# Patient Record
Sex: MALE | Race: BLACK OR AFRICAN AMERICAN | URBAN - METROPOLITAN AREA
[De-identification: names, ages, dates, MRNs, and addresses within clinical notes are randomized per-mention and may not be internally consistent; named-entity substitution may affect disease eponyms.]

---

## 2017-07-19 ENCOUNTER — OFFICE VISIT (OUTPATIENT)
Dept: INTERNAL MEDICINE CLINIC | Age: 57
End: 2017-07-19

## 2017-07-19 VITALS
DIASTOLIC BLOOD PRESSURE: 83 MMHG | RESPIRATION RATE: 18 BRPM | OXYGEN SATURATION: 100 % | HEIGHT: 74 IN | SYSTOLIC BLOOD PRESSURE: 128 MMHG | HEART RATE: 68 BPM | TEMPERATURE: 98 F | WEIGHT: 253.2 LBS | BODY MASS INDEX: 32.49 KG/M2

## 2017-07-19 DIAGNOSIS — Z12.5 SCREENING FOR PROSTATE CANCER: ICD-10-CM

## 2017-07-19 DIAGNOSIS — Z00.00 ROUTINE MEDICAL EXAM: Primary | ICD-10-CM

## 2017-07-19 DIAGNOSIS — B35.4 TINEA CORPORIS: ICD-10-CM

## 2017-07-19 DIAGNOSIS — Z12.11 SCREENING FOR COLON CANCER: ICD-10-CM

## 2017-07-19 NOTE — PATIENT INSTRUCTIONS
FOR THE FACIAL PATCHES, USE LOTRIMIN CREAM TWICE A DAY FOR 2-4 WEEKS. IF IN THE SUN, WEAR SUNSCREEN SINCE THE SUN WILL WORSEN THE RASH.

## 2017-07-19 NOTE — PROGRESS NOTES
HPI: Alberto Adams is a 49 y.o. male presents to establish. He does not recall last physical.  [facility]. Saw a chiropractor, better now. No medications for pain. Works for [employer], as a technician for 15 years. Is active. No sx with exertion. Family history of diabetes and HTN. Nonsmoker. , kids (18-35). Coping well with stress. Normal bowel and bladder habits. Stable weight. Reports right knee dark discoloration. Is kneeling a lot. Wearing pads on knees. ROS:  Constitutional: negative for fevers, chills, anorexia, weight loss  Eyes:   negative for visual disturbance, irritation  ENT:   negative for tinnitus,sore throat,nasal congestion,ear pain,  Respiratory:  negative for cough, hemoptysis, dyspnea,wheezing  CV:   negative for chest pain, palpitations, lower extremity edema  GI:   negative for nausea, vomiting, diarrhea, abdominal pain,melena  Endo:               negative for polyuria,polydipsia,polyphagia,heat intolerance  Genitourinary: negative for frequency, dysuria, hematuria  Integument:  negative for pruritus,positive for facial rash.    Hematologic:  negative for easy bruising and gum/nose bleeding  Musculoskel: negative for myalgias, back pain, muscle weakness, joint pain  Neurological:  negative for headaches, dizziness, gait problems, numbness  Behavl/Psych: negative for feelings of anxiety, depression, mood changes    Past Medical History:   Diagnosis Date    Arthritis     Left arm     Past Surgical History:   Procedure Laterality Date    HX HEENT      tooth implant right side     Social History     Social History    Marital status:      Spouse name: N/A    Number of children: N/A    Years of education: N/A     Social History Main Topics    Smoking status: Never Smoker    Smokeless tobacco: Never Used    Alcohol use No    Drug use: No    Sexual activity: Yes     Partners: Female     Other Topics Concern    None Social History Narrative    None     Family History   Problem Relation Age of Onset    Heart Disease Mother      cardiomyopathy due to chemo    Lung Disease Mother     Cancer Mother     Diabetes Father     Other Father      was shot    Hypertension Brother        Allergies   Allergen Reactions    Seasonale [Levonorgestrel-Ethinyl Estrad] Sneezing         Physical exam:  Visit Vitals    /83 (BP 1 Location: Left arm, BP Patient Position: Sitting)    Pulse 68    Temp 98 °F (36.7 °C) (Oral)    Resp 18    Ht 6' 2\" (1.88 m)    Wt 253 lb 3.2 oz (114.9 kg)    SpO2 100%    BMI 32.51 kg/m2     General appearance - alert, well appearing, and in no distress  HEENT- PERLL,normal conjunctiva, TM normal bilaterally, mucous membranes moist, pharynx normal without lesions  Neck - supple, no significant adenopathy   Pulm- clear to auscultation, no wheezes, rales or rhonchi  CV- normal rate, regular rhythm, normal S1, S2, no murmurs   Abdomen - soft, nontender, nondistended, no masses or organomegaly  Extrem-peripheral pulses normal, no pedal edema  Neuro -alert, oriented,nonfocal  Skin: facial hypopigmented patches. Assessment/Plan:    1. Routine medical exam- Recommend heart healthy diet and regular cardiovascular exercise. - METABOLIC PANEL, COMPREHENSIVE; Future  - CBC W/O DIFF; Future  - PSA W/ REFLX FREE PSA; Future  - LIPID PANEL; Future  - URINALYSIS W/ RFLX MICROSCOPIC; Future    2. Screening for prostate cancer    - PSA W/ REFLX FREE PSA; Future    3. Screening for colon cancer    - REFERRAL TO GASTROENTEROLOGY    4. Tinea corporis- use lotrimin cream, avoid sun exposure. Follow-up Disposition:  Return for follow up for fasting labs.   Eryn Curran MD

## 2017-07-19 NOTE — MR AVS SNAPSHOT
Visit Information Date & Time Provider Department Dept. Phone Encounter #  
 7/19/2017  9:30 AM Birgit Smith, [address] [id number] Follow-up Instructions Return for follow up for fasting labs. Melonie Jackson Upcoming Health Maintenance Date Due Hepatitis C Screening 1960 DTaP/Tdap/Td series (1 - Tdap) 12/12/1983 FOBT Q 1 YEAR AGE 50-75 12/12/2012 INFLUENZA AGE 9 TO ADULT 8/1/2017 Allergies as of 7/19/2017  Review Complete On: 7/19/2017 By: Birgit Smith MD  
  
 Severity Noted Reaction Type Reactions Seasonale [Levonorgestrel-ethinyl Estrad]  07/19/2017    Sneezing Current Immunizations  Never Reviewed No immunizations on file. Not reviewed this visit You Were Diagnosed With   
  
 Codes Comments Routine medical exam    -  Primary ICD-10-CM: Z00.00 ICD-9-CM: V70.0 Screening for prostate cancer     ICD-10-CM: Z12.5 ICD-9-CM: V76.44 Screening for colon cancer     ICD-10-CM: Z12.11 ICD-9-CM: V76.51 Tinea corporis     ICD-10-CM: B35.4 ICD-9-CM: 110.5 Vitals BP Pulse Temp Resp Height(growth percentile) Weight(growth percentile) 128/83 (BP 1 Location: Left arm, BP Patient Position: Sitting) 68 98 °F (36.7 °C) (Oral) 18 6' 2\" (1.88 m) 253 lb 3.2 oz (114.9 kg) SpO2 BMI Smoking Status 100% 32.51 kg/m2 Never Smoker BMI and BSA Data Body Mass Index Body Surface Area 32.51 kg/m 2 2.45 m 2 Preferred Pharmacy Pharmacy Name Phone [facility] [address]  [place] [phone] Your Updated Medication List  
  
Notice  As of 7/19/2017 10:32 AM  
 You have not been prescribed any medications. We Performed the Following REFERRAL TO GASTROENTEROLOGY [GOI36 Custom] Comments:  
 Colon screening Follow-up Instructions Return for follow up for fasting labs. Jayshreenéstor Paz To-Do List   
 07/19/2017 Lab:  CBC W/O DIFF   
  
 07/19/2017 Lab:  LIPID PANEL   
  
 07/19/2017 Lab:  METABOLIC PANEL, COMPREHENSIVE   
  
 07/19/2017 Lab:  PSA W/ REFLX FREE PSA   
  
 07/19/2017 Lab:  URINALYSIS W/ RFLX MICROSCOPIC Referral Information Referral ID Referred By Referred To  
  
 [id number] [facility] [address] [place], [address] [facility] Visits Status Start Date End Date 1 New Request 7/19/17 7/19/18 If your referral has a status of pending review or denied, additional information will be sent to support the outcome of this decision. Patient Instructions FOR THE FACIAL PATCHES, USE LOTRIMIN CREAM TWICE A DAY FOR 2-4 WEEKS. IF IN THE SUN, WEAR SUNSCREEN SINCE THE SUN WILL WORSEN THE RASH. Introducing \[facility]\"" & HEALTH SERVICES! Jenise Culver introduces [employer] patient portal. Now you can access parts of your medical record, email your doctor's office, and request medication refills online. 1. In your internet browser, go to [email or web address] 2. Click on the First Time User? Click Here link in the Sign In box. You will see the New Member Sign Up page. 3. Enter your [employer] Access Code exactly as it appears below. You will not need to use this code after youve completed the sign-up process. If you do not sign up before the expiration date, you must request a new code. · [employer] Access Code: [email or web address] Expires: 10/17/2017 10:32 AM 
 
4. Enter the last four digits of your Social Security Number (xxxx) and Date of Birth (mm/dd/yyyy) as indicated and click Submit. You will be taken to the next sign-up page. 5. Create a PO-MOt ID. This will be your [employer] login ID and cannot be changed, so think of one that is secure and easy to remember. 6. Create a PO-MOt password. You can change your password at any time. 7. Enter your Password Reset Question and Answer. This can be used at a later time if you forget your password. 8. Enter your e-mail address. You will receive e-mail notification when new information is available in [address] 9. Click Sign Up. You can now view and download portions of your medical record. 10. Click the Download Summary menu link to download a portable copy of your medical information. If you have questions, please visit the Frequently Asked Questions section of the [employer] website. Remember, [employer] is NOT to be used for urgent needs. For medical emergencies, dial 911. Now available from your iPhone and Android! Please provide this summary of care documentation to your next provider. Your primary care clinician is listed as Kathia Hedrick. If you have any questions after today's visit, please call [phone].

## 2017-07-19 NOTE — PROGRESS NOTES
Chief Complaint   Patient presents with    Establish Care     Pt fasting    Skin Problem     Patches Jorge under eyes, Right knee      Reviewed record In preparation for visit and have obtained necessary documentation    1. Have you been to the ER, urgent care clinic since your last visit? Hospitalized since your last visit? NO    2. Have you seen or consulted any other health care providers outside of the [address] [place] [address] since your last visit? Include any pap smears or colon screening. NO    Patient does not have advance directive on file and has received paperwork.

## 2021-01-08 ENCOUNTER — OFFICE VISIT (OUTPATIENT)
Dept: URGENT CARE | Age: 61
End: 2021-01-08
Payer: COMMERCIAL

## 2021-01-08 VITALS — HEART RATE: 84 BPM | OXYGEN SATURATION: 97 % | RESPIRATION RATE: 16 BRPM | TEMPERATURE: 99.4 F

## 2021-01-08 DIAGNOSIS — Z20.822 EXPOSURE TO COVID-19 VIRUS: Primary | ICD-10-CM

## 2021-01-08 PROCEDURE — 99203 OFFICE O/P NEW LOW 30 MIN: CPT | Performed by: NURSE PRACTITIONER

## 2021-01-08 NOTE — PROGRESS NOTES
Patient presents with a request for COVID Testing. He was exposed to a COVID positive person on Wednesday   He reports  muscle or body aches . Denies fever >100, chills, CP, SOB, loss of sense of smell/taste. Onset of sx:  2 days ago. Tx PTA includes:  nothing. .      This patient was seen in [facility] at [address] [place] [address] Urgent Care while in their vehicle due to COVID-19 pandemic with PPE and focused examination in order to decrease community viral transmission. The patient/guardian gave verbal consent to treat. The history is provided by the patient. No  was used.         Past Medical History:   Diagnosis Date    Arthritis     Left arm        Past Surgical History:   Procedure Laterality Date    HX HEENT      tooth implant right side         Family History   Problem Relation Age of Onset    Heart Disease Mother         cardiomyopathy due to chemo    Lung Disease Mother     Cancer Mother     Diabetes Father     Other Father         was shot    Hypertension Brother         Social History     Socioeconomic History    Marital status:      Spouse name: Not on file    Number of children: Not on file    Years of education: Not on file    Highest education level: Not on file   Occupational History    Not on file   Social Needs    Financial resource strain: Not on file    Food insecurity     Worry: Not on file     Inability: Not on file    Transportation needs     Medical: Not on file     Non-medical: Not on file   Tobacco Use    Smoking status: Never Smoker    Smokeless tobacco: Never Used   Substance and Sexual Activity    Alcohol use: No    Drug use: No    Sexual activity: Yes     Partners: Female   Lifestyle    Physical activity     Days per week: Not on file     Minutes per session: Not on file    Stress: Not on file   Relationships    Social connections     Talks on phone: Not on file     Gets together: Not on file     Attends [employer] service: Not on file     Active member of club or organization: Not on file     Attends meetings of clubs or organizations: Not on file     Relationship status: Not on file    Intimate partner violence     Fear of current or ex partner: Not on file     Emotionally abused: Not on file     Physically abused: Not on file     Forced sexual activity: Not on file   Other Topics Concern    Not on file   Social History Narrative    Not on file                ALLERGIES: Patient has no known allergies. Review of Systems   Constitutional: Negative for activity change, appetite change, chills and fever. HENT: Negative for congestion, postnasal drip, rhinorrhea, sinus pressure and sinus pain. Respiratory: Negative for cough, chest tightness and shortness of breath. Cardiovascular: Negative for chest pain. Gastrointestinal: Negative for diarrhea, nausea and vomiting. Musculoskeletal: Positive for myalgias. Vitals:    01/08/21 0918   Pulse: 84   Resp: 16   Temp: 99.4 °F (37.4 °C)   SpO2: 97%       Physical Exam  Vitals signs and nursing note reviewed. Constitutional:       General: He is not in acute distress. Appearance: Normal appearance. He is well-developed. He is not ill-appearing or diaphoretic. HENT:      Nose: Nose normal. No congestion or rhinorrhea. Cardiovascular:      Rate and Rhythm: Normal rate. Pulmonary:      Effort: Pulmonary effort is normal. No respiratory distress. Neurological:      Mental Status: He is alert. Psychiatric:         Behavior: Behavior is cooperative. MDM     Amount and/or Complexity of Data Reviewed:   Clinical lab tests:  Ordered (COVID testing )        ICD-10-CM ICD-9-CM    1. Exposure to COVID-19 virus  Z20.822 V01.79 NOVEL CORONAVIRUS (COVID-19)     No orders of the defined types were placed in this encounter. No results found for any visits on 01/08/21. The patients condition was discussed with the patient and they understand.   The patient is to follow up with primary care doctor. If signs and symptoms become worse the pt is to go to the ER. The patient is to take medications as prescribed.      Procedures

## 2021-01-08 NOTE — LETTER
[address] [place] [id number] Work/School Note Date: 1/8/2021 To Whom It May concern: 
 
 
 
Thuy Tinoco Was seen and evaluated in the Urgent Care today with COVID testing ordered. He was advised to quarantine per CDC recommendations as the COVID results are pending. Sincerely, Thania Taylor NP

## 2021-01-11 LAB — SARS-COV-2, NAA: NOT DETECTED
